# Patient Record
Sex: FEMALE
[De-identification: names, ages, dates, MRNs, and addresses within clinical notes are randomized per-mention and may not be internally consistent; named-entity substitution may affect disease eponyms.]

---

## 2019-02-19 ENCOUNTER — HOSPITAL ENCOUNTER (INPATIENT)
Dept: HOSPITAL 56 - MW.OBCHECK | Age: 34
LOS: 1 days | Discharge: HOME | End: 2019-02-20
Attending: OBSTETRICS & GYNECOLOGY | Admitting: OBSTETRICS & GYNECOLOGY
Payer: MEDICAID

## 2019-02-19 DIAGNOSIS — Z88.0: ICD-10-CM

## 2019-02-19 DIAGNOSIS — F32.9: ICD-10-CM

## 2019-02-19 DIAGNOSIS — E66.9: ICD-10-CM

## 2019-02-19 DIAGNOSIS — Z3A.38: ICD-10-CM

## 2019-02-19 DIAGNOSIS — F41.9: ICD-10-CM

## 2019-02-19 DIAGNOSIS — Z87.891: ICD-10-CM

## 2019-02-19 PROCEDURE — 10907ZC DRAINAGE OF AMNIOTIC FLUID, THERAPEUTIC FROM PRODUCTS OF CONCEPTION, VIA NATURAL OR ARTIFICIAL OPENING: ICD-10-PCS | Performed by: OBSTETRICS & GYNECOLOGY

## 2019-02-19 NOTE — OR
SURGEON:

Lebron Britt MD

 

DATE OF PROCEDURE:  2019

 

Ms. Pierre is a 34-year-old patient, she is para 3-0-0-3.  She is followed in

our clinic primarily by our nurse midwife.  She is admitted in active labor

early this morning.  At the time of admission, she was 4 cm, 80% vertex with

bulging bag of water with regular contraction as the patient had artificial

rupture of the membrane, which was clear fluid.  The patient continued to

contract without any aid of any Pitocin or any stimulation.  She went to 7, 9,

and then complete.  When she was able to accomplish normal spontaneous vaginal

delivery of a female fetus, cried immediately.  There was one nuchal cord and

later on, Apgar scores reported to be 8 and 9.  The weight is not available.

The placenta delivered spontaneous, complete, and intact without any problem.

There was no perineal or labial laceration.  Estimated blood loss was 250 to 300

mL.  Fetal heart rate was category 1 through the entire process of labor.  There

was no complication in this labor and delivery process.

 

 

JOSSY / KENNETH

DD:  2019 17:20:11

DT:  2019 17:33:47

Job #:  [id number]

## 2019-02-20 VITALS — DIASTOLIC BLOOD PRESSURE: 59 MMHG | SYSTOLIC BLOOD PRESSURE: 117 MMHG

## 2019-02-20 NOTE — PCM.LDHP
L&D History of Present Illness





- General


Date of Service: 02/20/19


Admit Problem/Dx: 


 Patient Status Order with Admit Dx/Problem





02/19/19 10:50


Patient Status [ADT] Routine 





02/19/19 17:15


Patient Status [ADT] Routine 








 Admission Diagnosis/Problem











Admission Diagnosis/Problem    Pregnancy














Source of Information: Patient


History Limitations: Reports: No Limitations





- History of Present Illness


Pain Score: 2


Improves with: Reports: None


Worsens with: Reports: None


Associated Symptoms: Reports: N





- Related Data


Allergies/Adverse Reactions: 


 Allergies











Allergy/AdvReac Type Severity Reaction Status Date / Time


 


Penicillins Allergy  Rash Verified 10/23/17 20:38











Home Medications: 


 Home Meds





Prenatal Vit Calc,Iron,Folic [Prenatal Vitamins] 1 tab PO DAILY 10/24/17 [

History]











Past Medical History


HEENT History: Reports: Other (See Below)


Other HEENT History: has upper dental bridge


Cardiovascular History: Reports: None


Respiratory History: Reports: None


Gastrointestinal History: Reports: None


Genitourinary History: Reports: None


OB/GYN History: Reports: Pregnancy


Musculoskeletal History: Reports: None


Neurological History: Reports: None


Psychiatric History: Reports: Anxiety, Depression


Endocrine/Metabolic History: Reports: Obesity/BMI 30+


Hematologic History: Reports: None


Immunologic History: Reports: None


Oncologic (Cancer) History: Reports: None


Dermatologic History: Reports: None





- Infectious Disease History


Infectious Disease History: Reports: Chicken Pox





- Past Surgical History


HEENT Surgical History: Reports: Adenoidectomy, Tonsillectomy





Social & Family History





- Family History


Family Medical History: Noncontributory


Respiratory: Reports: COPD, Other (See Below)


Other Respiratory Family Hisory: emphysema


: Reports: Other (See Below)


Other  Family History: kidney disease


OBGYN: Reports: Pregnancy


Endocrine/Metabolic: Reports: Diabetes, type II


Oncologic: Reports: Cervix, Lung





- Tobacco Use


Smoking Status *Q: Former Smoker


Used Tobacco, but Quit: Yes


Month/Year Tobacco Last Used: 2018





- Caffeine Use


Caffeine Use: Reports: Coffee, Energy Drinks, Soda





- Recreational Drug Use


Recreational Drug Use: No





H&P Review of Systems





- Review of Systems:


Review Of Systems: See Below


General: Reports: No Symptoms


HEENT: Reports: No Symptoms


Pulmonary: Reports: No Symptoms


Cardiovascular: Reports: No Symptoms


Gastrointestinal: Reports: No Symptoms


Genitourinary: Reports: No Symptoms


Musculoskeletal: Reports: No Symptoms


Skin: Reports: No Symptoms


Psychiatric: Reports: No Symptoms


Neurological: Reports: No Symptoms


Hematologic/Lymphatic: Reports: No Symptoms


Immunologic: Reports: No Symptoms





L&D Exam





- Exam


Exam: See Below





- Vital Signs


Vital Signs: 


 Last Vital Signs











Temp  36.7 C   02/19/19 19:30


 


Pulse  90   02/19/19 19:30


 


Resp  16   02/19/19 19:30


 


BP  109/62   02/19/19 19:30


 


Pulse Ox  99   02/19/19 19:30











Weight: 80.286 kg





- OB Specific


Fundal Height In cm: 37


Contraction Intensity: Moderate


Fetal Movement: Active


Fetal Heart Tones: Present


Birth Presentation: Vertex





- Palumbo Score


Palumbo Score Cervix Position: Anterior


Palumbo Score Consistency: Soft


Palumbo Score Effacement: >80%


Palumbo Score Dilation: 3-4 cm


Palumbo Score Infant's Station: -2


Palumbo Score Total: 10





- Exam


General: Alert, Oriented


HEENT: PERRLA, Conjunctiva Clear, EACs Clear, EOMI, Hearing Intact, Mucosa 

Moist & Pink, Nares Patent, Normal Nasal Septum, Posterior Pharynx Clear, TMs 

Clear


Neck: Supple, Trachea Midline


Lungs: Clear to Auscultation, Normal Respiratory Effort


Cardiovascular: Regular Rate, Regular Rhythm


GI/Abdominal Exam: Normal Bowel Sounds, Soft, Non-Tender, No Organomegaly, No 

Distention, No Abnormal Bruit, No Mass, Pelvis Stable


Rectal Exam: Normal Exam, Normal Rectal Tone


Genitourinary: Normal external exam, Normal bimanual exam, Normal speculum exam


Back Exam: Normal Inspection, Full Range of Motion


Extremities: Normal Inspection, Normal Range of Motion, Non-Tender, No Pedal 

Edema, Normal Capillary Refill


Skin: Warm, Dry, Intact


Neurological: Cranial Nerves Intact, Reflexes Equal Bilateral


Psychiatric: Alert, Normal Affect, Normal Mood





- Patient Data


Lab Results Last 24 hrs: 


 Laboratory Results - last 24 hr











  02/19/19 02/19/19 02/20/19 Range/Units





  11:09 11:09 04:50 


 


WBC  16.70 H    (4.0-11.0)  K/uL


 


RBC  3.85 L    (4.30-5.90)  M/uL


 


Hgb  12.5   10.4 L  (12.0-16.0)  g/dL


 


Hct  36.7   31.0 L  (36.0-46.0)  %


 


MCV  95.3    (80.0-98.0)  fL


 


MCH  32.5 H    (27.0-32.0)  pg


 


MCHC  34.1    (31.0-37.0)  g/dL


 


RDW Std Deviation  44.4    (28.0-62.0)  fl


 


RDW Coeff of Yamile  13    (11.0-15.0)  %


 


Plt Count  273    (150-400)  K/uL


 


MPV  10.20    (7.40-12.00)  fL


 


Nucleated RBC %  0.0    /100WBC


 


Nucleated RBCs #  0    K/uL


 


Blood Type   O POSITIVE   


 


Antibody Screen   NEGATIVE   











Result Diagrams: 


 02/20/19 04:50





Problem List Initiated/Reviewed/Updated: Yes


Orders Last 24hrs: 


 Active Orders 24 hr











 Category Date Time Status


 


 Patient Status [ADT] Routine ADT  02/19/19 17:15 Active


 


 Communication Order [RC] PER UNIT ROUTINE Care  02/19/19 17:15 Active


 


 Fetal Non Stress Test [RC] PER UNIT ROUTINE Care  02/19/19 10:34 Inactive


 


 May Shower [RC] ASDIRECTED Care  02/19/19 10:50 Active


 


 May Shower [RC] ASDIRECTED Care  02/19/19 17:15 Active


 


 RT Incentive Spirometry [RC] Q2HWA Care  02/19/19 17:15 Active


 


 Up ad Ana Cristina [RC] ASDIRECTED Care  02/19/19 10:34 Inactive


 


 Up ad Ana Cristina [RC] ASDIRECTED Care  02/19/19 10:50 Active


 


 Vaginal Exam [RC] Click to Edit Care  02/19/19 10:34 Inactive


 


 Vital Signs [RC] PER UNIT ROUTINE Care  02/19/19 10:34 Inactive


 


 Vital Signs [RC] PER UNIT ROUTINE Care  02/19/19 10:50 Active


 


 Vital Signs [RC] PER UNIT ROUTINE Care  02/19/19 17:15 Active


 


 Regular Diet [DIET] Diet  02/19/19 Dinner Active


 


 Acetaminophen/oxyCODONE [Percocet 325-5 MG] Med  02/19/19 17:15 Active





 1 tab PO Q4H PRN   


 


 Acetaminophen/oxyCODONE [Percocet 325-5 MG] Med  02/19/19 17:15 Active





 2 tab PO Q4H PRN   


 


 Bisacodyl [Dulcolax] Med  02/19/19 17:15 Active





 10 mg RECTAL ONETIME PRN   


 


 Butorphanol [Stadol] Med  02/19/19 15:36 Active





 1 mg IVPUSH Q1H PRN   


 


 Carboprost Tromethamine [Hemabate DS] Med  02/19/19 15:36 Active





 250 mcg IM ASDIRECTED PRN   


 


 Docusate Sodium [Colace] Med  02/19/19 21:00 Active





 100 mg PO BID   


 


 Ibuprofen [Motrin] Med  02/19/19 17:15 Active





 800 mg PO Q8H PRN   


 


 Ketorolac [Toradol] Med  02/19/19 17:15 Active





 30 mg IVPUSH Q6H   


 


 Lactated Ringers [Ringers, Lactated] 1,000 ml Med  02/19/19 15:45 Active





 IV ASDIRECTED   


 


 Lactated Ringers [Ringers, Lactated] 1,000 ml Med  02/19/19 17:15 Active





 IV ASDIRECTED   


 


 Lanolin [Lansinoh HPA] Med  02/19/19 17:15 Active





 See Dose Instructions  TOP ASDIRECTED PRN   


 


 Lidocaine 1% [Xylocaine 1%] Med  02/19/19 15:36 Active





 50 ml INJECT ONETIME PRN   


 


 Methylergonovine [Methergine] Med  02/19/19 15:36 Active





 0.2 mg IM ASDIRECTED PRN   


 


 Nalbuphine [Nubain] Med  02/19/19 15:36 Active





 10 mg IVPUSH Q1H PRN   


 


 Ondansetron [Zofran] Med  02/19/19 17:15 Active





 4 mg IVPUSH Q4H PRN   


 


 Oxytocin/0.9 % Sodium Chloride [Oxytocin 30 Unit/500 ML Med  02/19/19 15:45 

Active





 -NS]   





 30 unit in 500 ml IV TITRATE   


 


 Tranexamic Acid [Cyklokapron] 1,000 mg Med  02/19/19 15:36 Active





 Sodium Chloride 0.9% [Normal Saline] 100 ml   





 IV ONETIME   


 


 Water For Irrigation,Sterile [Sterile Water for Med  02/19/19 15:36 Active





 Irrigation]   





 1,000 ml IRR ASDIRECTED PRN   


 


 diphenhydrAMINE [Benadryl] Med  02/19/19 17:15 Active





 25 mg IVPUSH Q6H PRN   


 


 miSOPROStol [Cytotec] Med  02/19/19 15:36 Active





 200 mcg PO ONETIME PRN   


 


 Assess Lochia [WOMSER] Per Unit Routine Oth  02/19/19 17:15 Ordered


 


 Assess Uterine Involution [WOMSER] Per Unit Routine Ot  02/19/19 17:15 Ordered


 


 Breast Pump [WOMSER] Per Unit Routine Ot  02/19/19 17:15 Ordered


 


 Fetal Scalp Electrode [WOMSER] Per Unit Routine Ot  02/19/19 10:50 Ordered


 


 Peripheral IV Discontinue [OM.PC] Routine Ot  02/19/19 17:15 Ordered


 


 Peripheral IV Insertion Adult [OM.PC] Routine Ot  02/19/19 10:50 Ordered


 


 Sequential Compression Device [OM.PC] Per Unit Routine Ot  02/19/19 17:15 

Ordered


 


 Resuscitation Status Routine Resus Stat  02/19/19 10:50 Ordered








 Medication Orders





Bisacodyl (Dulcolax)  10 mg RECTAL ONETIME PRN


   PRN Reason: Constipation


Butorphanol Tartrate (Stadol)  1 mg IVPUSH Q1H PRN


   PRN Reason: Pain


Carboprost Tromethamine (Hemabate Ds)  250 mcg IM ASDIRECTED PRN


   PRN Reason: Post Partum Hemorrhage


Diphenhydramine HCl (Benadryl)  25 mg IVPUSH Q6H PRN


   PRN Reason: Itching or Nausea


Docusate Sodium (Colace)  100 mg PO BID [facility]


Emollient Ointment (Lansinoh Hpa)  0 gm TOP ASDIRECTED PRN


   PRN Reason: Sore Nipples


Lactated Ringer's (Ringers, Lactated)  1,000 mls @ 150 mls/hr IV ASDIRECTED [facility]


   Last Admin: 02/19/19 13:41  Dose: 150 mls/hr


Oxytocin/Sodium Chloride (Oxytocin 30 Unit/500 Ml-Ns)  30 unit in 500 mls @ 999 

mls/hr IV TITRATE [facility]


   Last Admin: 02/19/19 17:07  Dose: 999 mls/hr


Tranexamic Acid 1,000 mg/ (Sodium Chloride)  110 mls @ 660 mls/hr IV ONETIME PRN


   PRN Reason: Bleeding


Lactated Ringer's (Ringers, Lactated)  1,000 mls @ 125 mls/hr IV ASDIRECTED [facility]


Ibuprofen (Motrin)  800 mg PO Q8H PRN


   PRN Reason: mild pain or fever


   Last Admin: 02/19/19 19:10  Dose: 800 mg


Ketorolac Tromethamine (Toradol)  30 mg IVPUSH Q6H [facility]


   Stop: 02/20/19 17:16


Lidocaine HCl (Xylocaine 1%)  50 ml INJECT ONETIME PRN


   PRN Reason: Laceration repair


Methylergonovine Maleate (Methergine)  0.2 mg IM ASDIRECTED PRN


   PRN Reason: Post Partum Hemorrhage


Misoprostol (Cytotec)  200 mcg PO ONETIME PRN


   PRN Reason: Post Partum Hemorrhage


Nalbuphine HCl (Nubain)  10 mg IVPUSH Q1H PRN


   PRN Reason: Pain (severe 7-10)


Ondansetron HCl (Zofran)  4 mg IVPUSH Q4H PRN


   PRN Reason: Nausea/Vomiting


Oxycodone/Acetaminophen (Percocet 325-5 Mg)  1 tab PO Q4H PRN


   PRN Reason: Pain (moderate 4-6)


Oxycodone/Acetaminophen (Percocet 325-5 Mg)  2 tab PO Q4H PRN


   PRN Reason: Pain (moderate 4-6)


Sterile Water (Sterile Water For Irrigation)  1,000 ml IRR ASDIRECTED PRN


   PRN Reason: delivery








Assessment/Plan Comment:: 





Term multiple patient admitted in active labor anticipating normal spontaneous 

vaginal delivery

## 2019-02-20 NOTE — PCM.PNPP
- General Info


Date of Service: 19


Functional Status: Reports: Pain Controlled





- Review of Systems


General: Reports: No Symptoms


HEENT: Reports: No Symptoms


Pulmonary: Reports: No Symptoms


Cardiovascular: Reports: No Symptoms


Gastrointestinal: Reports: No Symptoms


Genitourinary: Reports: No Symptoms


Musculoskeletal: Reports: No Symptoms


Skin: Reports: No Symptoms


Neurological: Reports: No Symptoms


Psychiatric: Reports: No Symptoms





- General Info


Date of Service: 19





- Patient Data


Vital Signs - Most Recent: 


 Last Vital Signs











Temp  36.7 C   19 19:30


 


Pulse  90   19 19:30


 


Resp  16   19 19:30


 


BP  109/62   19 19:30


 


Pulse Ox  99   19 19:30











Weight - Most Recent: 80.286 kg


Lab Results - Last 24 Hours: 


 Laboratory Results - last 24 hr











  19 Range/Units





  11:09 11:09 04:50 


 


WBC  16.70 H    (4.0-11.0)  K/uL


 


RBC  3.85 L    (4.30-5.90)  M/uL


 


Hgb  12.5   10.4 L  (12.0-16.0)  g/dL


 


Hct  36.7   31.0 L  (36.0-46.0)  %


 


MCV  95.3    (80.0-98.0)  fL


 


MCH  32.5 H    (27.0-32.0)  pg


 


MCHC  34.1    (31.0-37.0)  g/dL


 


RDW Std Deviation  44.4    (28.0-62.0)  fl


 


RDW Coeff of Yamile  13    (11.0-15.0)  %


 


Plt Count  273    (150-400)  K/uL


 


MPV  10.20    (7.40-12.00)  fL


 


Nucleated RBC %  0.0    /100WBC


 


Nucleated RBCs #  0    K/uL


 


Blood Type   O POSITIVE   


 


Antibody Screen   NEGATIVE   











Med Orders - Current: 


 Current Medications





Bisacodyl (Dulcolax)  10 mg RECTAL ONETIME PRN


   PRN Reason: Constipation


Butorphanol Tartrate (Stadol)  1 mg IVPUSH Q1H PRN


   PRN Reason: Pain


Carboprost Tromethamine (Hemabate Ds)  250 mcg IM ASDIRECTED PRN


   PRN Reason: Post Partum Hemorrhage


Diphenhydramine HCl (Benadryl)  25 mg IVPUSH Q6H PRN


   PRN Reason: Itching or Nausea


Docusate Sodium (Colace)  100 mg PO BID [facility]


Emollient Ointment (Lansinoh Hpa)  0 gm TOP ASDIRECTED PRN


   PRN Reason: Sore Nipples


Lactated Ringer's (Ringers, Lactated)  1,000 mls @ 150 mls/hr IV ASDIRECTED [facility]


   Last Admin: 19 13:41 Dose:  150 mls/hr


Oxytocin/Sodium Chloride (Oxytocin 30 Unit/500 Ml-Ns)  30 unit in 500 mls @ 999 

mls/hr IV TITRATE [facility]


   Last Admin: 19 17:07 Dose:  999 mls/hr


Tranexamic Acid 1,000 mg/ (Sodium Chloride)  110 mls @ 660 mls/hr IV ONETIME PRN


   PRN Reason: Bleeding


Lactated Ringer's (Ringers, Lactated)  1,000 mls @ 125 mls/hr IV ASDIRECTED [facility]


Ibuprofen (Motrin)  800 mg PO Q8H PRN


   PRN Reason: mild pain or fever


   Last Admin: 19 19:10 Dose:  800 mg


Ketorolac Tromethamine (Toradol)  30 mg IVPUSH Q6H [facility]


   Stop: 19 17:16


Lidocaine HCl (Xylocaine 1%)  50 ml INJECT ONETIME PRN


   PRN Reason: Laceration repair


Methylergonovine Maleate (Methergine)  0.2 mg IM ASDIRECTED PRN


   PRN Reason: Post Partum Hemorrhage


Misoprostol (Cytotec)  200 mcg PO ONETIME PRN


   PRN Reason: Post Partum Hemorrhage


Nalbuphine HCl (Nubain)  10 mg IVPUSH Q1H PRN


   PRN Reason: Pain (severe 7-10)


Ondansetron HCl (Zofran)  4 mg IVPUSH Q4H PRN


   PRN Reason: Nausea/Vomiting


Oxycodone/Acetaminophen (Percocet 325-5 Mg)  1 tab PO Q4H PRN


   PRN Reason: Pain (moderate 4-6)


Oxycodone/Acetaminophen (Percocet 325-5 Mg)  2 tab PO Q4H PRN


   PRN Reason: Pain (moderate 4-6)


Sterile Water (Sterile Water For Irrigation)  1,000 ml IRR ASDIRECTED PRN


   PRN Reason: delivery





Discontinued Medications





Acetaminophen (Tylenol Extra Strength) Confirm Administered Dose 1,000 mg 

.ROUTE .STK-MED ONE


   Stop: 19 00:27











- Infant Interaction


Infant Disposition, Postpartum: [place] in Room with Family


Infant Interaction: Holding Infant


Infant Feeding: Attempted Breastfeeding; Nursed Fair/Poor


Support Person: Significant Other





- Exam


General: Alert, Oriented


HEENT: Pupils Equal


Neck: Supple


Lungs: Clear to Auscultation, Normal Respiratory Effort


Cardiovascular: Regular Rate, Regular Rhythm


GI/Abdominal Exam: Normal Bowel Sounds, Soft, Non-Tender, No Organomegaly, No 

Distention, No Abnormal Bruit, No Mass, Pelvis Stable


Extremities: Normal Inspection, Normal Range of Motion, Non-Tender, No Pedal 

Edema, Normal Capillary Refill


Skin: Warm, Dry, Intact


Wound/Incisions: Healing Well


Neurological: No New Focal Deficit


Psy/Mental Status: Alert, Normal Affect, Normal Mood





- Problem List Review


Problem List Initiated/Reviewed/Updated: Yes





- My Orders


Last 24 Hours: 


My Active Orders





19 10:34


Fetal Non Stress Test [RC] PER UNIT ROUTINE 


Up ad Ana Cristina [RC] ASDIRECTED 


Vaginal Exam [RC] Click to Edit 


Vital Signs [RC] PER UNIT ROUTINE 





19 10:50


May Shower [RC] ASDIRECTED 


Up ad Ana Cristina [RC] ASDIRECTED 


Vital Signs [RC] PER UNIT ROUTINE 


Fetal Scalp Electrode [WOMSER] Per Unit Routine 


Peripheral IV Insertion Adult [OM.PC] Routine 


Resuscitation Status Routine 





19 15:36


Butorphanol [Stadol]   1 mg IVPUSH Q1H PRN 


Carboprost Tromethamine [Hemabate DS]   250 mcg IM ASDIRECTED PRN 


Lidocaine 1% [Xylocaine 1%]   50 ml INJECT ONETIME PRN 


Methylergonovine [Methergine]   0.2 mg IM ASDIRECTED PRN 


Nalbuphine [Nubain]   10 mg IVPUSH Q1H PRN 


Tranexamic Acid [Cyklokapron] 1,000 mg   Sodium Chloride 0.9% [Normal Saline] 

100 ml IV ONETIME 


Water For Irrigation,Sterile [Sterile Water for Irrigation]   1,000 ml IRR 

ASDIRECTED PRN 


miSOPROStol [Cytotec]   200 mcg PO ONETIME PRN 





19 15:45


Lactated Ringers [Ringers, Lactated] 1,000 ml IV ASDIRECTED 


Oxytocin/0.9 % Sodium Chloride [Oxytocin 30 Unit/500 ML-NS] 30 unit in 500 ml 

IV TITRATE 





19 17:15


Patient Status [ADT] Routine 


Communication Order [RC] PER UNIT ROUTINE 


May Shower [RC] ASDIRECTED 


RT Incentive Spirometry [RC] Q2HWA 


Vital Signs [RC] PER UNIT ROUTINE 


Acetaminophen/oxyCODONE [Percocet 325-5 MG]   1 tab PO Q4H PRN 


Acetaminophen/oxyCODONE [Percocet 325-5 MG]   2 tab PO Q4H PRN 


Bisacodyl [Dulcolax]   10 mg RECTAL ONETIME PRN 


Ibuprofen [Motrin]   800 mg PO Q8H PRN 


Ketorolac [Toradol]   30 mg IVPUSH Q6H 


Lactated Ringers [Ringers, Lactated] 1,000 ml IV ASDIRECTED 


Lanolin [Lansinoh HPA]   See Dose Instructions  TOP ASDIRECTED PRN 


Ondansetron [Zofran]   4 mg IVPUSH Q4H PRN 


diphenhydrAMINE [Benadryl]   25 mg IVPUSH Q6H PRN 


Assess Lochia [WOMSER] Per Unit Routine 


Assess Uterine Involution [WOMSER] Per Unit Routine 


Breast Pump [WOMSER] Per Unit Routine 


Peripheral IV Discontinue [OM.PC] Routine 


Sequential Compression Device [OM.PC] Per Unit Routine 





19 21:00


Docusate Sodium [Colace]   100 mg PO BID 





19 Dinner


Regular Diet [DIET] 














- Plan


Plan:: 





Term multiple patient admitted in active labor anticipating normal spontaneous 

vaginal delivery

## 2019-02-20 NOTE — PCM.DCSUM1
**Discharge Summary





- Hospital Course


Diagnosis: Stroke: No





- Discharge Data


Discharge Date: 02/20/19


Discharge Disposition: Home, Self-Care 01


Condition: Good





- Patient Instructions


Diet: Usual Diet as Tolerated


Activity: As Tolerated


Driving: Do Not Drive


Showering/Bathing: May Shower





- Discharge Plan


Home Medications: 


 Home Meds





Prenatal Vit Calc,Iron,Folic [Prenatal Vitamins] 1 tab PO DAILY 10/24/17 [

History]








Referrals: 


[facility] [Outside]


Aspen Garrido CNM [Primary Care Provider] - 04/03/19 8:00 am





- Discharge Summary/Plan Comment


DC Time >30 min.: Yes





- General Info


Date of Service: 02/20/19


Functional Status: Reports: Pain Controlled





- Review of Systems


General: Reports: No Symptoms


HEENT: Reports: No Symptoms


Pulmonary: Reports: No Symptoms


Cardiovascular: Reports: No Symptoms


Gastrointestinal: Reports: No Symptoms


Genitourinary: Reports: No Symptoms


Musculoskeletal: Reports: No Symptoms


Skin: Reports: No Symptoms


Neurological: Reports: No Symptoms


Psychiatric: Reports: No Symptoms





- Patient Data


Vitals - Most Recent: 


 Last Vital Signs











Temp  36.7 C   02/19/19 19:30


 


Pulse  90   02/19/19 19:30


 


Resp  16   02/19/19 19:30


 


BP  109/62   02/19/19 19:30


 


Pulse Ox  99   02/19/19 19:30











Weight - Most Recent: 80.286 kg


Lab Results - Last 24 hrs: 


 Laboratory Results - last 24 hr











  02/19/19 02/19/19 02/20/19 Range/Units





  11:09 11:09 04:50 


 


WBC  16.70 H    (4.0-11.0)  K/uL


 


RBC  3.85 L    (4.30-5.90)  M/uL


 


Hgb  12.5   10.4 L  (12.0-16.0)  g/dL


 


Hct  36.7   31.0 L  (36.0-46.0)  %


 


MCV  95.3    (80.0-98.0)  fL


 


MCH  32.5 H    (27.0-32.0)  pg


 


MCHC  34.1    (31.0-37.0)  g/dL


 


RDW Std Deviation  44.4    (28.0-62.0)  fl


 


RDW Coeff of Yamile  13    (11.0-15.0)  %


 


Plt Count  273    (150-400)  K/uL


 


MPV  10.20    (7.40-12.00)  fL


 


Nucleated RBC %  0.0    /100WBC


 


Nucleated RBCs #  0    K/uL


 


Blood Type   O POSITIVE   


 


Antibody Screen   NEGATIVE   











Med Orders - Current: 


 Current Medications





Bisacodyl (Dulcolax)  10 mg RECTAL ONETIME PRN


   PRN Reason: Constipation


Butorphanol Tartrate (Stadol)  1 mg IVPUSH Q1H PRN


   PRN Reason: Pain


Carboprost Tromethamine (Hemabate Ds)  250 mcg IM ASDIRECTED PRN


   PRN Reason: Post Partum Hemorrhage


Diphenhydramine HCl (Benadryl)  25 mg IVPUSH Q6H PRN


   PRN Reason: Itching or Nausea


Docusate Sodium (Colace)  100 mg PO BID [facility]


Emollient Ointment (Lansinoh Hpa)  0 gm TOP ASDIRECTED PRN


   PRN Reason: Sore Nipples


Lactated Ringer's (Ringers, Lactated)  1,000 mls @ 150 mls/hr IV ASDIRECTED [facility]


   Last Admin: 02/19/19 13:41 Dose:  150 mls/hr


Oxytocin/Sodium Chloride (Oxytocin 30 Unit/500 Ml-Ns)  30 unit in 500 mls @ 999 

mls/hr IV TITRATE [facility]


   Last Admin: 02/19/19 17:07 Dose:  999 mls/hr


Tranexamic Acid 1,000 mg/ (Sodium Chloride)  110 mls @ 660 mls/hr IV ONETIME PRN


   PRN Reason: Bleeding


Lactated Ringer's (Ringers, Lactated)  1,000 mls @ 125 mls/hr IV ASDIRECTED [facility]


Ibuprofen (Motrin)  800 mg PO Q8H PRN


   PRN Reason: mild pain or fever


   Last Admin: 02/19/19 19:10 Dose:  800 mg


Ketorolac Tromethamine (Toradol)  30 mg IVPUSH Q6H [facility]


   Stop: 02/20/19 17:16


Lidocaine HCl (Xylocaine 1%)  50 ml INJECT ONETIME PRN


   PRN Reason: Laceration repair


Methylergonovine Maleate (Methergine)  0.2 mg IM ASDIRECTED PRN


   PRN Reason: Post Partum Hemorrhage


Misoprostol (Cytotec)  200 mcg PO ONETIME PRN


   PRN Reason: Post Partum Hemorrhage


Nalbuphine HCl (Nubain)  10 mg IVPUSH Q1H PRN


   PRN Reason: Pain (severe 7-10)


Ondansetron HCl (Zofran)  4 mg IVPUSH Q4H PRN


   PRN Reason: Nausea/Vomiting


Oxycodone/Acetaminophen (Percocet 325-5 Mg)  1 tab PO Q4H PRN


   PRN Reason: Pain (moderate 4-6)


Oxycodone/Acetaminophen (Percocet 325-5 Mg)  2 tab PO Q4H PRN


   PRN Reason: Pain (moderate 4-6)


Sterile Water (Sterile Water For Irrigation)  1,000 ml IRR ASDIRECTED PRN


   PRN Reason: delivery





Discontinued Medications





Acetaminophen (Tylenol Extra Strength) Confirm Administered Dose 1,000 mg 

.ROUTE .[facility]


   Stop: 02/20/19 00:27











- Exam


General: Reports: Alert, Oriented


HEENT: Reports: Pupils Equal, Pupils Reactive, EOMI, Mucous Membr. Moist/Pink


Neck: Reports: Supple


Lungs: Reports: Clear to Auscultation, Normal Respiratory Effort


Cardiovascular: Reports: Regular Rate, Regular Rhythm


GI/Abdominal Exam: Normal Bowel Sounds, Soft, Non-Tender, No Organomegaly, No 

Distention, No Abnormal Bruit, No Mass, Pelvis Stable


 (Female) Exam: Normal External Exam, Normal Speculum Exam, Normal Bimanual 

Exam


Rectal (Female) Exam: Normal Exam, Normal Rectal Tone


Back Exam: Reports: Normal Inspection, Full Range of Motion


Extremities: Normal Inspection, Normal Range of Motion, Non-Tender, No Pedal 

Edema, Normal Capillary Refill


Skin: Reports: Warm, Dry, Intact


Wound/Incisions: Reports: Healing Well


Neurological: Reports: No New Focal Deficit


Psy/Mental Status: Reports: Alert, Normal Affect, Normal Mood

## 2020-03-26 ENCOUNTER — HOSPITAL ENCOUNTER (EMERGENCY)
Dept: HOSPITAL 56 - MW.ED | Age: 35
Discharge: HOME | End: 2020-03-26
Payer: SELF-PAY

## 2020-03-26 VITALS — SYSTOLIC BLOOD PRESSURE: 98 MMHG | DIASTOLIC BLOOD PRESSURE: 67 MMHG | HEART RATE: 99 BPM

## 2020-03-26 DIAGNOSIS — Z88.0: ICD-10-CM

## 2020-03-26 DIAGNOSIS — O03.4: Primary | ICD-10-CM

## 2020-03-26 LAB
BUN SERPL-MCNC: 8 MG/DL (ref 7–18)
CHLORIDE SERPL-SCNC: 106 MMOL/L (ref 98–107)
CO2 SERPL-SCNC: 26.5 MMOL/L (ref 21–32)
GLUCOSE SERPL-MCNC: 108 MG/DL (ref 74–106)
POTASSIUM SERPL-SCNC: 4.2 MMOL/L (ref 3.5–5.1)
SODIUM SERPL-SCNC: 141 MMOL/L (ref 136–145)

## 2020-03-26 PROCEDURE — 36415 COLL VENOUS BLD VENIPUNCTURE: CPT

## 2020-03-26 PROCEDURE — 96375 TX/PRO/DX INJ NEW DRUG ADDON: CPT

## 2020-03-26 PROCEDURE — 80053 COMPREHEN METABOLIC PANEL: CPT

## 2020-03-26 PROCEDURE — 86920 COMPATIBILITY TEST SPIN: CPT

## 2020-03-26 PROCEDURE — 96374 THER/PROPH/DIAG INJ IV PUSH: CPT

## 2020-03-26 PROCEDURE — 84702 CHORIONIC GONADOTROPIN TEST: CPT

## 2020-03-26 PROCEDURE — 85025 COMPLETE CBC W/AUTO DIFF WBC: CPT

## 2020-03-26 PROCEDURE — 86900 BLOOD TYPING SEROLOGIC ABO: CPT

## 2020-03-26 PROCEDURE — 86922 COMPATIBILITY TEST ANTIGLOB: CPT

## 2020-03-26 PROCEDURE — 85384 FIBRINOGEN ACTIVITY: CPT

## 2020-03-26 PROCEDURE — 71045 X-RAY EXAM CHEST 1 VIEW: CPT

## 2020-03-26 PROCEDURE — P9016 RBC LEUKOCYTES REDUCED: HCPCS

## 2020-03-26 PROCEDURE — 96372 THER/PROPH/DIAG INJ SC/IM: CPT

## 2020-03-26 PROCEDURE — 99285 EMERGENCY DEPT VISIT HI MDM: CPT

## 2020-03-26 PROCEDURE — 86921 COMPATIBILITY TEST INCUBATE: CPT

## 2020-03-26 PROCEDURE — 86850 RBC ANTIBODY SCREEN: CPT

## 2020-03-26 PROCEDURE — 36430 TRANSFUSION BLD/BLD COMPNT: CPT

## 2020-03-26 PROCEDURE — 85610 PROTHROMBIN TIME: CPT

## 2020-03-26 PROCEDURE — 86901 BLOOD TYPING SEROLOGIC RH(D): CPT

## 2020-03-26 PROCEDURE — 85730 THROMBOPLASTIN TIME PARTIAL: CPT

## 2020-03-26 NOTE — EDM.PDOC
ED [facility] GENERAL MEDICAL PROBLEM





- General


Chief Complaint: OB/GYN Problem


Stated Complaint: MISCARRIAGE


Time Seen by Provider: 03/26/20 09:00


Source of Information: Reports: Patient, EMS


History Limitations: Reports: No Limitations





- History of Present Illness


INITIAL COMMENTS - FREE TEXT/NARRATIVE: 


Patient is a 28-year-old 5-year-old female with no significant past medical 

history presenting with a chief complaint of vaginal bleeding.  Patient was 

approximately 10 to 11 weeks pregnant and had a miscarriage diagnosed several 

days ago.  Patient says she started bleeding this morning and has been passing 

large amounts of blood.  Patient states she is 4 pads and then was sitting in 

the bathtub with increased bleeding.  EMS said that the toilet was full of 

blood as well as the bathtub.  Patient feels associated lightheadedness and 

fatigue.  Patient denies any nausea vomiting.  Patient reports some associated 

abdominal cramping.  Nothing makes symptoms better or worse.  EMS provided the 

patient with 1 g of TXA in the field.  Patient's blood pressure per EMS was 90/

60.





Pmhx: No prior miscarriages


Pshx: None


Family Hx: noncontributory 





Smoking history? no 


Etoh use? none


Drug use? none





In addition to that documented in the HPI above, the additional ROS was obtained

:


Constitutional: Denies fevers or chills


Eyes: Denies vision changes


ENMT: Denies sore throat


CV: Denies chest pain


Resp: Denies SOB


GI: Denies vomiting or diarrhea


: Denies painful urination


MSK: Denies recent trauma


Skin: Denies new rashes


Neuro: Denies new numbness or tingling or weakness


Endocrine: Denies unexpected weight loss


Heme: Denies bleeding disorders





I have reviewed the triage vital signs


Const: Patient is pale in appearance but and not in distress


Eyes: PERRL, no conjunctival injection


HENT: NCAT, Neck supple without meningismus 


CV: RRR, Warm, well-perfused extremities


RESP: CTAB, Unlabored respiratory effort


GI: soft, non-tender, non-distended, no masses


Pelvic (KUNAL Biswas present): Patient demonstrates active vaginal bleeding no 

masses or clots noted.


MSK: No gross deformities appreciated


Skin: Warm, dry. No rashes


Neuro: Alert, CNs II-XII grossly intact. Sensation and motor function of 

extremities grossly intact.


Psych: Appropriate mood and affect





Assessment and plan:


Patient is a 35-year-old female presenting with vaginal bleeding to the ER.  

Patient was initially hypotensive likely secondary to the large hemorrhage.  

Patient was given 2 L of IV fluids initially without much response.  Patient 

was given Methergine and was previously given TXA by EMS.  Patient continued 

her main hypertensive and had further bleeding therefore Dr. Yarbrough was paged 

and he arrived in the emergency department.  He found that patient had retained 

products of conception and remove them and the emergency department.  Patient 

was given 1 unit PRBCs secondary to profound hypotension in the setting of 

acute hemorrhage.  Patient's repeat CBC showed improvement and the patient 

feels much better.  Patient complaining of some slight bleeding but appears 

well and is not passing clots.  Patient's blood pressure was normalized and 

patient has no other complaints.  The patient's blood test did not demonstrate 

any other acute abnormalities of be concerning for DIC.  All questions 

addressed and answered.  Patient given follow-up as an outpatient.





  ** Pelvic


Pain Score (Numeric/FACES): 7





- Related Data


 Allergies











Allergy/AdvReac Type Severity Reaction Status Date / Time


 


Penicillins Allergy  Rash Verified 03/26/20 09:00











Home Meds: 


 Home Meds





Misoprostol [Cytotec] 200 mg PO Q12H 03/26/20 [History]











Past Medical History


HEENT History: Reports: Other (See Below)


Other HEENT History: has upper dental bridge


Cardiovascular History: Reports: None


Respiratory History: Reports: None


Gastrointestinal History: Reports: None


Genitourinary History: Reports: None


OB/GYN History: Reports: Pregnancy


Musculoskeletal History: Reports: None


Neurological History: Reports: None


Psychiatric History: Reports: Anxiety, Depression


Endocrine/Metabolic History: Reports: Obesity/BMI 30+


Hematologic History: Reports: None


Immunologic History: Reports: None


Oncologic (Cancer) History: Reports: None


Dermatologic History: Reports: None





- Infectious Disease History


Infectious Disease History: Reports: Chicken Pox





- Past Surgical History


Head Surgeries/Procedures: Reports: None


HEENT Surgical History: Reports: Adenoidectomy, Tonsillectomy





Social & Family History





- Family History


Family Medical History: Noncontributory


Respiratory: Reports: COPD, Other (See Below)


Other Respiratory Family Hisory: emphysema


: Reports: Other (See Below)


Other  Family History: kidney disease


OBGYN: Reports: Pregnancy


Endocrine/Metabolic: Reports: Diabetes, type II


Oncologic: Reports: Cervix, Lung





- Tobacco Use


Smoking Status *Q: Unknown Ever Smoked





- Caffeine Use


Caffeine Use: Reports: Coffee, Energy Drinks, Soda





- Recreational Drug Use


Recreational Drug Use: No





ED ROS GENERAL





- Review of Systems


Review Of Systems: See Below





ED EXAM PREGNANCY





- Physical Exam


Exam: See Below





Course





- Vital Signs


Last Recorded V/S: 


 Last Vital Signs











Temp  36.7 C   03/26/20 09:01


 


Pulse  85   03/26/20 10:55


 


Resp  17   03/26/20 10:55


 


BP  113/61   03/26/20 10:55


 


Pulse Ox  99   03/26/20 10:55














- Orders/Labs/Meds


Orders: 


 Active Orders 24 hr











 Category Date Time Status


 


 RED BLOOD CELLS LP [BBK] Stat Lab  03/26/20 09:34 Results


 


 TYPE AND SCREEN [BBK] Stat Lab  03/26/20 09:34 Results


 


 Norepinephrine Bit/0.9 % NaCl [Norepinephr-0.9% NaCl 4 Med  03/26/20 11:00 

Active





 mg/250]   





 4 mg in 250 ml IV TITRATE   


 


 Transfuse RBC [Transfuse Red Blood Cells] [COMM] Stat Oth  03/26/20 09:56 

Ordered


 


 Transfuse Red Blood Cells [COMM] Stat Oth  03/26/20 09:50 Ordered








 Medication Orders





Norepinephrine Bitartrate (Norepinephr-0.9% Nacl 4 Mg/250)  4 mg in 250 mls @ 

7.5 mls/hr IV TITRATE PRATEEK; Protocol








Labs: 


 Laboratory Tests











  03/26/20 03/26/20 03/26/20 Range/Units





  09:34 09:34 09:34 


 


WBC  13.66 H    (4.0-11.0)  K/uL


 


RBC  2.90 L    (4.30-5.90)  M/uL


 


Hgb  9.4 L    (12.0-16.0)  g/dL


 


Hct  27.4 L    (36.0-46.0)  %


 


MCV  94.5    (80.0-98.0)  fL


 


MCH  32.4 H    (27.0-32.0)  pg


 


MCHC  34.3    (31.0-37.0)  g/dL


 


RDW Std Deviation  42.1    (28.0-62.0)  fl


 


RDW Coeff of Yamile  12    (11.0-15.0)  %


 


Plt Count  221    (150-400)  K/uL


 


MPV  10.00    (7.40-12.00)  fL


 


Neut % (Auto)  77.2    (48.0-80.0)  %


 


Lymph % (Auto)  16.3    (16.0-40.0)  %


 


Mono % (Auto)  5.8    (0.0-15.0)  %


 


Eos % (Auto)  0.6    (0.0-7.0)  %


 


Baso % (Auto)  0.1    (0.0-1.5)  %


 


Neut # (Auto)  10.5 H    (1.4-5.7)  K/uL


 


Lymph # (Auto)  2.2    (0.6-2.4)  K/uL


 


Mono # (Auto)  0.8    (0.0-0.8)  K/uL


 


Eos # (Auto)  0.1    (0.0-0.7)  K/uL


 


Baso # (Auto)  0.0    (0.0-0.1)  K/uL


 


Nucleated RBC %  0.0    /100WBC


 


Nucleated RBCs #  0    K/uL


 


INR   1.01   


 


APTT     (18.6-31.3)  SEC


 


Fibrinogen   320   (215-411)  mg/dL


 


Sodium    141  (136-145)  mmol/L


 


Potassium    4.2  (3.5-5.1)  mmol/L


 


Chloride    106  ()  mmol/L


 


Carbon Dioxide    26.5  (21.0-32.0)  mmol/L


 


BUN    8  (7.0-18.0)  mg/dL


 


Creatinine    0.8  (0.6-1.0)  mg/dL


 


Est Cr Clr Drug Dosing    86.64  mL/min


 


Estimated GFR (MDRD)    > 60.0  ml/min


 


Glucose    108 H  ()  mg/dL


 


Calcium    8.0 L  (8.5-10.1)  mg/dL


 


Total Bilirubin    0.2  (0.2-1.0)  mg/dL


 


AST    14 L  (15-37)  IU/L


 


ALT    15  (14-63)  IU/L


 


Alkaline Phosphatase    46  ()  U/L


 


Total Protein    5.3 L  (6.4-8.2)  g/dL


 


Albumin    2.6 L  (3.4-5.0)  g/dL


 


Globulin    2.7  (2.6-4.0)  g/dL


 


Albumin/Globulin Ratio    1.0  (0.9-1.6)  


 


HCG, Quant     mIU/mL


 


Blood Type     


 


Antibody Screen     


 


Crossmatch     














  03/26/20 03/26/20 03/26/20 Range/Units





  09:34 09:34 09:34 


 


WBC     (4.0-11.0)  K/uL


 


RBC     (4.30-5.90)  M/uL


 


Hgb     (12.0-16.0)  g/dL


 


Hct     (36.0-46.0)  %


 


MCV     (80.0-98.0)  fL


 


MCH     (27.0-32.0)  pg


 


MCHC     (31.0-37.0)  g/dL


 


RDW Std Deviation     (28.0-62.0)  fl


 


RDW Coeff of Yamile     (11.0-15.0)  %


 


Plt Count     (150-400)  K/uL


 


MPV     (7.40-12.00)  fL


 


Neut % (Auto)     (48.0-80.0)  %


 


Lymph % (Auto)     (16.0-40.0)  %


 


Mono % (Auto)     (0.0-15.0)  %


 


Eos % (Auto)     (0.0-7.0)  %


 


Baso % (Auto)     (0.0-1.5)  %


 


Neut # (Auto)     (1.4-5.7)  K/uL


 


Lymph # (Auto)     (0.6-2.4)  K/uL


 


Mono # (Auto)     (0.0-0.8)  K/uL


 


Eos # (Auto)     (0.0-0.7)  K/uL


 


Baso # (Auto)     (0.0-0.1)  K/uL


 


Nucleated RBC %     /100WBC


 


Nucleated RBCs #     K/uL


 


INR     


 


APTT    19.6  (18.6-31.3)  SEC


 


Fibrinogen     (215-411)  mg/dL


 


Sodium     (136-145)  mmol/L


 


Potassium     (3.5-5.1)  mmol/L


 


Chloride     ()  mmol/L


 


Carbon Dioxide     (21.0-32.0)  mmol/L


 


BUN     (7.0-18.0)  mg/dL


 


Creatinine     (0.6-1.0)  mg/dL


 


Est Cr Clr Drug Dosing     mL/min


 


Estimated GFR (MDRD)     ml/min


 


Glucose     ()  mg/dL


 


Calcium     (8.5-10.1)  mg/dL


 


Total Bilirubin     (0.2-1.0)  mg/dL


 


AST     (15-37)  IU/L


 


ALT     (14-63)  IU/L


 


Alkaline Phosphatase     ()  U/L


 


Total Protein     (6.4-8.2)  g/dL


 


Albumin     (3.4-5.0)  g/dL


 


Globulin     (2.6-4.0)  g/dL


 


Albumin/Globulin Ratio     (0.9-1.6)  


 


HCG, Quant   4110.0   mIU/mL


 


Blood Type  O POSITIVE    


 


Antibody Screen  NEGATIVE    


 


Crossmatch  See Detail    














  03/26/20 Range/Units





  11:45 


 


WBC  13.63 H  (4.0-11.0)  K/uL


 


RBC  3.35 L  (4.30-5.90)  M/uL


 


Hgb  10.7 L  (12.0-16.0)  g/dL


 


Hct  31.7 L  (36.0-46.0)  %


 


MCV  94.6  (80.0-98.0)  fL


 


MCH  31.9  (27.0-32.0)  pg


 


MCHC  33.8  (31.0-37.0)  g/dL


 


RDW Std Deviation  43.3  (28.0-62.0)  fl


 


RDW Coeff of Yamile  13  (11.0-15.0)  %


 


Plt Count  189  (150-400)  K/uL


 


MPV  10.00  (7.40-12.00)  fL


 


Neut % (Auto)  82.7 H  (48.0-80.0)  %


 


Lymph % (Auto)  13.6 L  (16.0-40.0)  %


 


Mono % (Auto)  3.4  (0.0-15.0)  %


 


Eos % (Auto)  0.2  (0.0-7.0)  %


 


Baso % (Auto)  0.1  (0.0-1.5)  %


 


Neut # (Auto)  11.3 H  (1.4-5.7)  K/uL


 


Lymph # (Auto)  1.9  (0.6-2.4)  K/uL


 


Mono # (Auto)  0.5  (0.0-0.8)  K/uL


 


Eos # (Auto)  0.0  (0.0-0.7)  K/uL


 


Baso # (Auto)  0.0  (0.0-0.1)  K/uL


 


Nucleated RBC %  0.0  /100WBC


 


Nucleated RBCs #  0  K/uL


 


INR   


 


APTT   (18.6-31.3)  SEC


 


Fibrinogen   (215-411)  mg/dL


 


Sodium   (136-145)  mmol/L


 


Potassium   (3.5-5.1)  mmol/L


 


Chloride   ()  mmol/L


 


Carbon Dioxide   (21.0-32.0)  mmol/L


 


BUN   (7.0-18.0)  mg/dL


 


Creatinine   (0.6-1.0)  mg/dL


 


Est Cr Clr Drug Dosing   mL/min


 


Estimated GFR (MDRD)   ml/min


 


Glucose   ()  mg/dL


 


Calcium   (8.5-10.1)  mg/dL


 


Total Bilirubin   (0.2-1.0)  mg/dL


 


AST   (15-37)  IU/L


 


ALT   (14-63)  IU/L


 


Alkaline Phosphatase   ()  U/L


 


Total Protein   (6.4-8.2)  g/dL


 


Albumin   (3.4-5.0)  g/dL


 


Globulin   (2.6-4.0)  g/dL


 


Albumin/Globulin Ratio   (0.9-1.6)  


 


HCG, Quant   mIU/mL


 


Blood Type   


 


Antibody Screen   


 


Crossmatch   











Meds: 


Medications











Generic Name Dose Route Start Last Admin





  Trade Name Freq  PRN Reason Stop Dose Admin


 


Norepinephrine Bitartrate  4 mg in 250 mls @ 7.5 mls/hr  03/26/20 11:00  





  Norepinephr-0.9% Nacl 4 Mg/250  IV   





  TITRATE PRATEEK   





     





     





  Protocol   





  2 MCG/MIN   














Discontinued Medications














Generic Name Dose Route Start Last Admin





  Trade Name Freq  PRN Reason Stop Dose Admin


 


Norepinephrine Bitartrate  Confirm  03/26/20 10:55  





  Norepinephr-0.9% Nacl 4 Mg/250  Administered  03/26/20 10:56  





  Dose   





  4 mg in 250 mls @ as directed   





  IV   





  .STK-MED ONE   





     





     





     





     


 


Methylergonovine Maleate  0.2 mg  03/26/20 09:45  03/26/20 09:58





  Methergine  IM  03/26/20 09:46  0.2 mg





  Q4H STA   Administration





     





     





     





     


 


Morphine Sulfate  4 mg  03/26/20 09:01  03/26/20 09:33





  Morphine  IVPUSH  03/26/20 09:02  4 mg





  ONETIME ONE   Administration





     





     





     





     


 


Ondansetron HCl  4 mg  03/26/20 09:02  03/26/20 09:33





  Zofran  IVPUSH  03/26/20 09:03  4 mg





  ONETIME ONE   Administration





     





     





     





     


 


Oxytocin  10 unit  03/26/20 09:34  03/26/20 09:39





  Pitocin  IM  03/26/20 09:35  10 unit





  ONETIME ONE   Administration





     





     





     





     














Departure





- Departure


Time of Disposition: 12:20


Disposition: Home, Self-Care 01


Clinical Impression: 


 Retained products of conception with hemorrhage








- Discharge Information


Instructions:  Dilation and Curettage or Vacuum Curettage, Care After, Easy-to-

Read, Postpartum Hemorrhage


Referrals: 


Charles De Dios MD [Primary Care Provider] - 


Forms:  ED Department Discharge


Additional Instructions: 


The following information is given to patients seen in the emergency department 

who are being discharged to home. This information is to outline your options 

for follow-up care. We provide all patients seen in our emergency department 

with a follow-up referral.





The need for follow-up, as well as the timing and circumstances, are variable 

depending upon the specifics of your emergency department visit.





If you don't have a primary care physician on staff, we will provide you with a 

referral. We always advise you to contact your personal physician following an 

emergency department visit to inform them of the circumstance of the visit and 

for follow-up with them and/or the need for any referrals to a consulting 

specialist.





The emergency department will also refer you to a specialist when appropriate. 

This referral assures that you have the opportunity for follow-up care with a 

specialist. All of these measure are taken in an effort to provide you with 

optimal care, which includes your follow-up.





Under all circumstances we always encourage you to contact your private 

physician who remains a resource for coordinating your care. When calling for 

follow-up care, please make the office aware that this follow-up is from your 

recent emergency room visit. If for any reason you are refused follow-up, 

please contact the [facility] Emergency 

Department at [phone] and asked to speak to the emergency department 

charge nurse.








Sepsis Event Note





- Evaluation


Sepsis Screening Result: No Definite Risk





- Focused Exam


Vital Signs: 


 Vital Signs











  Temp Pulse Resp BP Pulse Ox


 


 03/26/20 10:55   85  17  113/61  99


 


 03/26/20 10:53   64  17  56/28 L  97


 


 03/26/20 10:51   68  18  59/28 L  99


 


 03/26/20 10:50   68  16  98/54 L  99


 


 03/26/20 10:40   68  16  101/55 L  98


 


 03/26/20 10:20   68  16  104/55 L  98


 


 03/26/20 09:01  36.7 C  92  22 H  108/60  100











Date Exam was Performed: 03/26/20


Time Exam was Performed: 12:17





- My Orders


Last 24 Hours: 


My Active Orders





03/26/20 09:34


RED BLOOD CELLS LP [BBK] Stat 


TYPE AND SCREEN [BBK] Stat 





03/26/20 09:50


Transfuse Red Blood Cells [COMM] Stat 





03/26/20 09:56


Transfuse RBC [Transfuse Red Blood Cells] [COMM] Stat 





03/26/20 11:00


Norepinephrine Bit/0.9 % NaCl [Norepinephr-0.9% NaCl 4 mg/250] 4 mg in 250 ml 

IV TITRATE 














- Assessment/Plan


Last 24 Hours: 


My Active Orders





03/26/20 09:34


RED BLOOD CELLS LP [BBK] Stat 


TYPE AND SCREEN [BBK] Stat 





03/26/20 09:50


Transfuse Red Blood Cells [COMM] Stat 





03/26/20 09:56


Transfuse RBC [Transfuse Red Blood Cells] [COMM] Stat 





03/26/20 11:00


Norepinephrine Bit/0.9 % NaCl [Norepinephr-0.9% NaCl 4 mg/250] 4 mg in 250 ml 

IV TITRATE

## 2020-03-26 NOTE — CR
Chest: Portable view of the chest was obtained.

 

Comparison: No prior chest imaging.

 

Heart size and mediastinum are normal.  Lungs are clear with no acute 

parenchymal change.  Bony structures are unremarkable.

 

Impression:

1.  Nothing acute is appreciated on portable chest x-ray.

 

Diagnostic code #1

 

This report was dictated in MDT

## 2020-03-29 NOTE — CONS
DATE OF CONSULTATION:

03/26/2020

 

YOB: 1985

 

PRIMARY CARE PHYSICIAN:

Charles De Dios M.D.

 

This patient is 35.  I had seen her in the clinic couple of times.  She had

declining beta hCG level with ultrasound that shows no fetal pull and her

gestational sac was collapsing.  The patient is diagnosed with a blighted ovum,

and we elected to treat her conservatively, and I gave her Cytotec; however, the

patient presented to the emergency room with excessive vaginal bleeding.  I was

consulted.  Upon examination and upon arrival, the patient's vital signs were

stable.  Speculum examination shows copious amount of blood and blood clot in

the vagina and after removing all the blood and blood clot, the cervical os was

open and the products of conception was protruding through the cervical os.  I

used the ring forceps to remove all the products of conception.  Once that was

done, the bleeding stopped, and the uterus was contract, and the patient stopped

bleeding.  I instructed the ER personnel to observe her for a while and do

appropriate measure if it is needed and to discharge her home when she is

stable, and she is to be followed in the office sometime next week.

 

 

JOSSY / KENNETH

DD:  03/29/2020 15:06:23

DT:  03/29/2020 17:19:06

Job #:  [id number]

DEWAYNE

## 2021-03-01 NOTE — PCM.LDHP
L&D History of Present Illness





- General


Date of Service: 21


Admit Problem/Dx: 


                   Patient Status Order with Admit Dx/Problem





21 05:04


Patient Status [ADT] Routine 








                           Admission Diagnosis/Problem











Admission Diagnosis/Problem    Pregnancy














21 07:56


 presenting to L&D for IOL at 38 weeks (MARIELA: 03/15/21 by LMP and early 

ultrasound) due to AMA.  O+, Rubella immune, GBS negative.  Penicillin allergy. 

Vertex presentation by Leopold's and confirmed by handheld bedside ultrasound.  


21 08:13





Source of Information: Patient


History Limitations: Reports: No Limitations





- Related Data


Allergies/Adverse Reactions: 


                                    Allergies











Allergy/AdvReac Type Severity Reaction Status Date / Time


 


Penicillins Allergy Mild Rash Verified 21 10:59











Home Medications: 


                                    Home Meds





Pnv No.95/Ferrous Fum/Folic AC [Prenatal Caplet] 1 tab PO DAILY 21 

[History]











Past Medical History


HEENT History: Reports: Other (See Below)


Other HEENT History: has upper dental bridge


Cardiovascular History: Reports: None


Respiratory History: Reports: None


Gastrointestinal History: Reports: None


Genitourinary History: Reports: None


OB/GYN History: Reports: Pregnancy


Musculoskeletal History: Reports: None


Neurological History: Reports: None


Psychiatric History: Reports: Anxiety, Depression


Endocrine/Metabolic History: Reports: Obesity/BMI 30+


Hematologic History: Reports: None


Immunologic History: Reports: None


Oncologic (Cancer) History: Reports: None


Dermatologic History: Reports: None





- Infectious Disease History


Infectious Disease History: Reports: Chicken Pox





- Past Surgical History


Head Surgeries/Procedures: Reports: None


HEENT Surgical History: Reports: Adenoidectomy, Tonsillectomy


Cardiovascular Surgical History: Reports: None


Respiratory Surgical History: Reports: None


GI Surgical History: Reports: None


Female  Surgical History: Reports: None


Endocrine Surgical History: Reports: None


Neurological Surgical History: Reports: None


Musculoskeletal Surgical History: Reports: None


Oncologic Surgical History: Reports: None





Social & Family History





- Family History


Family Medical History: No Pertinent Family History


Respiratory: Reports: COPD, Other (See Below)


Other Respiratory Family Hisory: emphysema


: Reports: Other (See Below)


Other  Family History: kidney disease


OBGYN: Reports: Pregnancy


Endocrine/Metabolic: Reports: Diabetes, type II


Oncologic: Reports: Cervix, Lung





- Tobacco Use


Tobacco Use Status *Q: Current Every Day Tobacco User


Years of Tobacco use: 20


Packs/Tins Daily: 1


Second Hand Smoke Exposure: Yes





- Caffeine Use


Caffeine Use: Reports: Soda





- Recreational Drug Use


Recreational Drug Use: No





H&P Review of Systems





- Review of Systems:


Review Of Systems: See Below


General: Reports: No Symptoms


HEENT: Reports: No Symptoms


Pulmonary: Reports: No Symptoms


Cardiovascular: Reports: No Symptoms


Gastrointestinal: Reports: No Symptoms


Genitourinary: Reports: No Symptoms


Musculoskeletal: Reports: No Symptoms


Skin: Reports: No Symptoms


Psychiatric: Reports: No Symptoms


Neurological: Reports: No Symptoms


Hematologic/Lymphatic: Reports: No Symptoms


Immunologic: Reports: No Symptoms





L&D Exam





- Exam


Exam: See Below





- Vital Signs


Weight: 196 lb





- OB Specific


Fetal Movement: Active


Fetal Heart Tones: Present


Fetal Heart Rate (FHR) Variability: Moderate (6-25 bmp)


Birth Presentation: Vertex





- Palumbo Score


Palumbo Score Cervix Position: Midposition


Palumbo Score Consistency: Medium


Palumbo Score Effacement: 31-50%


Palumbo Score Dilation: 1-2 cm


Palumbo Score Infant's Station: -3


Palumbo Score Total: 4





- Exam


General: Alert, Oriented, Cooperative


Lungs: Normal Respiratory Effort


Cardiovascular: Regular Rate, Regular Rhythm


GI/Abdominal Exam: Soft, Non-Tender


Rectal Exam: Deferred


Genitourinary: Normal external exam


Back Exam: Normal Inspection, Full Range of Motion


Extremities: Normal Inspection, Normal Range of Motion, Non-Tender, Normal 

Capillary Refill


Skin: Warm, Dry, Intact


Neurological: Normal Speech, Normal Tone, Sensation Intact


Psychiatric: Alert, Normal Affect, Normal Mood





- Patient Data


Lab Results Last 24 hrs: 


                         Laboratory Results - last 24 hr











  21 Range/Units





  05:30 05:40 06:55 


 


WBC   12.79 H   (4.0-11.0)  K/uL


 


RBC   3.88 L   (4.30-5.90)  M/uL


 


Hgb   12.5   (12.0-16.0)  g/dL


 


Hct   37.5   (36.0-46.0)  %


 


MCV   96.6   (80.0-98.0)  fL


 


MCH   32.2 H   (27.0-32.0)  pg


 


MCHC   33.3   (31.0-37.0)  g/dL


 


RDW Std Deviation   44.6   (28.0-62.0)  fl


 


RDW Coeff of Yamile   13   (11.0-15.0)  %


 


Plt Count   248   (150-400)  K/uL


 


MPV   10.40   (7.40-12.00)  fL


 


Nucleated RBC %   0.0   /100WBC


 


Nucleated RBCs #   0   K/uL


 


SARS-CoV-2 RNA (PRATEEK)  NEGATIVE    (NEGATIVE)  


 


Blood Type    O POSITIVE  


 


Antibody Screen    NEGATIVE  











Result Diagrams: 


                                 21 05:40








- Problem List


(1) Supervision of normal IUP (intrauterine pregnancy) in multigravida


SNOMED Code(s): 934117639, 062570078, 503371150


   ICD Code: Z34.80 - ENCOUNTER FOR SUPRVSN OF NORMAL PREGNANCY, UNSP TRIMESTER 

  Status: Acute   Priority: High   Current Visit: Yes   


Qualifiers: 


   Trimester: third trimester   Qualified Code(s): Z34.83 - Encounter for 

supervision of other normal pregnancy, third trimester   





(2) AMA (advanced maternal age) multigravida 35+


SNOMED Code(s): 677689340


   ICD Code: O09.529 - SUPERVISION OF ELDERLY MULTIGRAVIDA, UNSPECIFIED 

TRIMESTER   Status: Acute   Priority: High   Current Visit: Yes   


Qualifiers: 


   Trimester: third trimester   Qualified Code(s): O09.523 - Supervision of 

elderly multigravida, third trimester   


Problem List Initiated/Reviewed/Updated: Yes


Orders Last 24hrs: 


                               Active Orders 24 hr











 Category Date Time Status


 


 Patient Status [ADT] Routine ADT  21 05:04 Active


 


 Bedrest Bathroom Privileges [RC] ASDIRECTED Care  21 05:04 Active


 


 Communication Order [RC] ASDIRECTED Care  21 05:04 Active


 


 Communication Order [RC] ASDIRECTED Care  21 05:04 Active


 


 Communication Order [RC] ASDIRECTED Care  21 05:04 Active


 


 Fetal Heart Tones [RC] CONTINUOUS Care  21 05:04 Active


 


 Fetal Non Stress Test [RC] PER UNIT ROUTINE Care  21 05:04 Active


 


 May Shower [RC] ASDIRECTED Care  21 05:04 Active


 


 Notify Provider [RC] PRN Care  21 05:04 Active


 


 Notify Provider [RC] PRN Care  21 05:04 Active


 


 Notify Provider [RC] PRN Care  21 05:04 Active


 


 Notify Provider [RC] STAT Care  21 05:04 Active


 


 Oxygen Therapy [RC] ASDIRECTED Care  21 05:04 Active


 


 Up ad Ana Cristina [RC] ASDIRECTED Care  21 05:04 Active


 


 Vaginal Exam [RC] PRN Care  21 05:04 Active


 


 Vaginal Exam [RC] PRN Care  21 05:04 Active


 


 Vital Signs [RC] PER UNIT ROUTINE Care  21 05:04 Active


 


 Vital Signs [RC] PER UNIT ROUTINE Care  21 05:04 Active


 


 RPR (SYPHILIS SERO) W/ RFLX [REF] Routine Lab  21 05:40 Received


 


 Butorphanol [Stadol] Med  21 05:04 Active





 1 mg IVPUSH Q1H PRN   


 


 Carboprost Tromethamine [Hemabate DS] Med  21 05:04 Active





 250 mcg IM ASDIRECTED PRN   


 


 Lactated Ringers [Ringers, Lactated] 1,000 ml Med  21 05:15 Active





 IV ASDIRECTED   


 


 Lidocaine 1% [Xylocaine 1%] Med  21 05:04 Active





 50 ml INJECT ONETIME PRN   


 


 Methylergonovine [Methergine] Med  21 05:04 Active





 0.2 mg IM ASDIRECTED PRN   


 


 Nalbuphine [Nubain] Med  21 05:04 Active





 10 mg IVPUSH Q1H PRN   


 


 Ondansetron [Zofran] Med  21 05:04 Active





 4 mg IVPUSH Q6H PRN   


 


 Oxytocin/0.9 % Sodium Chloride [Oxytocin 30 Unit/500 ML Med  21 05:15 

Active





 -NS]   





 30 unit in 500 ml IV TITRATE   


 


 Oxytocin/0.9 % Sodium Chloride [Oxytocin 30 Unit/500 ML Med  21 05:15 

Active





 -NS]   





 30 unit in 500 ml IV TITRATE   


 


 Sodium Chloride 0.9% [Normal Saline] Med  21 05:04 Active





 10 ml IV ASDIRECTED PRN   


 


 Sodium Chloride 0.9% [Saline Flush] Med  21 05:04 Active





 10 ml FLUSH ASDIRECTED PRN   


 


 Sodium Chloride 0.9% [Saline Flush] Med  21 05:04 Active





 2.5 ml FLUSH ASDIRECTED PRN   


 


 Terbutaline [Brethine] Med  21 05:04 Active





 0.25 mg SUBCUT ASDIRECTED PRN   


 


 Tranexamic Acid [Cyklokapron] 1,000 mg Med  21 05:04 Active





 Sodium Chloride 0.9% [Normal Saline] 100 ml   





 IV ONETIME   


 


 Water For Irrigation,Sterile [Sterile Water for Med  21 05:04 Active





 Irrigation]   





 1,000 ml IRR ASDIRECTED PRN   


 


 miSOPROStoL [Cytotec] Med  21 05:04 Active





 200 mcg PO ONETIME PRN   


 


 miSOPROStoL [Cytotec] Med  21 05:04 Active





 25 mcg VAG ONETIME PRN   


 


 miSOPROStoL [Cytotec] Med  21 05:04 Active





 25 mcg VAG Q4H PRN   


 


 Fetal Scalp Electrode [WOMSER] Per Unit Routine Oth  21 05:04 Ordered


 


 Medication Administration Instruction [OM.PC] Q3H Oth  21 05:15 Ordered


 


 Peripheral IV Insertion Adult [OM.PC] Routine Oth  21 05:04 Ordered


 


 Resuscitation Status Routine Resus Stat  21 05:04 Ordered








                                Medication Orders





Butorphanol Tartrate (Stadol)  1 mg IVPUSH Q1H PRN


   PRN Reason: Pain


Carboprost Tromethamine (Hemabate Ds)  250 mcg IM ASDIRECTED PRN


   PRN Reason: Post Partum Hemorrhage


Oxytocin/Sodium Chloride (Oxytocin 30 Unit/500 Ml-Ns)  30 unit in 500 mls @ 999 

mls/hr IV TITRATE PRATEEK


Tranexamic Acid 1,000 mg/ (Sodium Chloride)  110 mls @ 660 mls/hr IV ONETIME PRN


   PRN Reason: Bleeding


Oxytocin/Sodium Chloride (Oxytocin 30 Unit/500 Ml-Ns)  30 unit in 500 mls @ 2 

mls/hr IV TITRATE [facility]; Protocol


Lactated Ringer's (Ringers, Lactated)  1,000 mls @ 150 mls/hr IV ASDIRECTED PRATEEK


   Last Admin: 21 06:30  Dose: 150 mls/hr


   Documented by: ARABELLA


Lidocaine HCl (Xylocaine 1%)  50 ml INJECT ONETIME PRN


   PRN Reason: Laceration repair


Methylergonovine Maleate (Methergine)  0.2 mg IM ASDIRECTED PRN


   PRN Reason: Post Partum Hemorrhage


Misoprostol (Cytotec)  200 mcg PO ONETIME PRN


   PRN Reason: Post Partum Hemorrhage


Misoprostol (Cytotec)  25 mcg VAG ONETIME PRN


   PRN Reason: Cervical Ripening


Misoprostol (Cytotec)  25 mcg VAG Q4H PRN


   PRN Reason: Cervical Ripening


Nalbuphine HCl (Nubain)  10 mg IVPUSH Q1H PRN


   PRN Reason: Pain (severe 7-10)


Ondansetron HCl (Zofran)  4 mg IVPUSH Q6H PRN


   PRN Reason: Nausea/Vomiting


Sodium Chloride (Saline Flush)  10 ml FLUSH ASDIRECTED PRN


   PRN Reason: Keep Vein Open


Sodium Chloride (Saline Flush)  2.5 ml FLUSH ASDIRECTED PRN


   PRN Reason: Keep Vein Open


Sodium Chloride (Normal Saline)  10 ml IV ASDIRECTED PRN


   PRN Reason: IV Use


Sterile Water (Sterile Water For Irrigation)  1,000 ml IRR ASDIRECTED PRN


   PRN Reason: delivery


Terbutaline Sulfate (Brethine)  0.25 mg SUBCUT ASDIRECTED PRN


   PRN Reason: Tacysystole








Assessment/Plan Comment:: 





Admit


A:  presenting to L&D for IOL at 38 weeks (MARIELA: 03/15/21 by LMP and early

 ultrasound) due to AMA.  O+, Rubella immune, GBS negative.  Penicillin allergy.

 Vertex presentation by Leopold's and confirmed by handheld bedside ultrasound.


P: Admit for induction of labor; cytotec to pitocin PRN; epidural PRN; 

anticipate ; Dr. Britt updated.

## 2021-03-01 NOTE — PCM.DEL
L & D Note





- General Info


Date of Service: 21


Mother's Due Date: 03/15/21





- Delivery Note


Labor: Spontaneous


Cervical Ripening Method: Misoprostil


Delivery Outcome: Livebirth


Infant Delivery Method: Spontaneous Vaginal Delivery-Single


Birth Presentation: Vertex


Nuchal Cord: None


Anesthesia Type: None


Episiotomy Type: None


Laceration: None


Placenta: Intact, Spontaneous


Cord: 3 Vessels


Estimated Blood Loss: 300


Resuscitation Needed: No


APGAR Score 1 min: 9


APGAR Score 5 min: 9


Second Stage Interventions: Reports: Second Nurse Assessed Progress of Descent, 

Second Nurse Reviewed Contraction Pattern, Second Nurse Reviewed Fetal Heart 

Tones, Encouragement Given, Pushing, Feet in Foot Rests


Delivery Comments (Free Text/Narrative):: 





 viable male; head delivered with good pushing, shoulders and body followed 

easily after; baby to mom's abdomen skin-to-skin for assessment; APGARs 9/9, 

weight pending; cord doubly clamped, cut by FOB after cessation of pulsing; 

placenta delivered grossly intact; 3VC;  mL; perineum intact; pitocin to 

IVF; mom and baby left in stable condition with nurse at bedside for assessment





- General Info


Date of Service: 21


Admission Dx/Problem (Free Text): 


                   Patient Status Order with Admit Dx/Problem





21 05:04


Patient Status [ADT] Routine 








                           Admission Diagnosis/Problem











Admission Diagnosis/Problem    Pregnancy














21 07:56


 presenting to L&D for IOL at 38 weeks (MARIELA: 03/15/21 by LMP and early 

ultrasound) due to AMA.  O+, Rubella immune, GBS negative.  Penicillin allergy. 

Vertex presentation by Leopold's and confirmed by handheld bedside ultrasound.  


21 08:13





Functional Status: Reports: Pain Controlled





- Review of Systems


General: Reports: No Symptoms


HEENT: Reports: No Symptoms


Pulmonary: Reports: No Symptoms


Cardiovascular: Reports: No Symptoms


Gastrointestinal: Reports: No Symptoms


Genitourinary: Reports: No Symptoms


Musculoskeletal: Reports: No Symptoms


Skin: Reports: No Symptoms


Neurological: Reports: No Symptoms


Psychiatric: Reports: No Symptoms





- Patient Data


Weight - Most Recent: 196 lb


I&O - Last 24 Hours: 


                                 Intake & Output











 21





 06:59 14:59 22:59


 


Intake Total  1000 950


 


Balance  1000 950











Lab Results Last 24 Hours: 


                         Laboratory Results - last 24 hr











  21 Range/Units





  05:30 05:40 06:55 


 


WBC   12.79 H   (4.0-11.0)  K/uL


 


RBC   3.88 L   (4.30-5.90)  M/uL


 


Hgb   12.5   (12.0-16.0)  g/dL


 


Hct   37.5   (36.0-46.0)  %


 


MCV   96.6   (80.0-98.0)  fL


 


MCH   32.2 H   (27.0-32.0)  pg


 


MCHC   33.3   (31.0-37.0)  g/dL


 


RDW Std Deviation   44.6   (28.0-62.0)  fl


 


RDW Coeff of Yamile   13   (11.0-15.0)  %


 


Plt Count   248   (150-400)  K/uL


 


MPV   10.40   (7.40-12.00)  fL


 


Nucleated RBC %   0.0   /100WBC


 


Nucleated RBCs #   0   K/uL


 


SARS-CoV-2 RNA (PRTAEEK)  NEGATIVE    (NEGATIVE)  


 


Blood Type    O POSITIVE  


 


Antibody Screen    NEGATIVE  











Med Orders - Current: 


                               Current Medications








Discontinued Medications





Butorphanol Tartrate (Stadol)  1 mg IVPUSH Q1H PRN


   PRN Reason: Pain


Carboprost Tromethamine (Hemabate Ds)  250 mcg IM ASDIRECTED PRN


   PRN Reason: Post Partum Hemorrhage


Oxytocin/Sodium Chloride (Oxytocin 30 Unit/500 Ml-Ns)  30 unit in 500 mls @ 999 

mls/hr IV TITRATE PRATEEK


   Last Admin: 21 17:36 Dose:  500 mls/hr


   Documented by: 


Tranexamic Acid 1,000 mg/ (Sodium Chloride)  110 mls @ 660 mls/hr IV ONETIME PRN


   PRN Reason: Bleeding


Oxytocin/Sodium Chloride (Oxytocin 30 Unit/500 Ml-Ns)  30 unit in 500 mls @ 2 

mls/hr IV TITRATE PRATEEK; Protocol


Lactated Ringer's (Ringers, Lactated)  1,000 mls @ 150 mls/hr IV ASDIRECTED PRATEEK


   Last Admin: 21 11:35 Dose:  150 mls/hr


   Documented by: 


Lidocaine HCl (Xylocaine 1%)  50 ml INJECT ONETIME PRN


   PRN Reason: Laceration repair


Methylergonovine Maleate (Methergine)  0.2 mg IM ASDIRECTED PRN


   PRN Reason: Post Partum Hemorrhage


Misoprostol (Cytotec)  200 mcg PO ONETIME PRN


   PRN Reason: Post Partum Hemorrhage


Misoprostol (Cytotec)  25 mcg VAG ONETIME PRN


   PRN Reason: Cervical Ripening


   Last Admin: 21 12:21 Dose:  25 mcg


   Documented by: 


Misoprostol (Cytotec)  25 mcg VAG Q4H PRN


   PRN Reason: Cervical Ripening


   Last Admin: 21 08:18 Dose:  25 mcg


   Documented by: 


Misoprostol (Cytotec)  25 mcg PO ONETIME ONE


   Stop: 21 05:24


   Last Admin: 21 08:11 Dose:  25 mcg


   Documented by: 


Misoprostol (Cytotec)  25 mcg PO Q4H PRATEEK


   Last Admin: 21 12:19 Dose:  25 mcg


   Documented by: 


Nalbuphine HCl (Nubain)  10 mg IVPUSH Q1H PRN


   PRN Reason: Pain (severe 7-10)


Ondansetron HCl (Zofran)  4 mg IVPUSH Q6H PRN


   PRN Reason: Nausea/Vomiting


Sodium Chloride (Saline Flush)  10 ml FLUSH ASDIRECTED PRN


   PRN Reason: Keep Vein Open


Sodium Chloride (Saline Flush)  2.5 ml FLUSH ASDIRECTED PRN


   PRN Reason: Keep Vein Open


Sodium Chloride (Normal Saline)  10 ml IV ASDIRECTED PRN


   PRN Reason: IV Use


Sterile Water (Sterile Water For Irrigation)  1,000 ml IRR ASDIRECTED PRN


   PRN Reason: delivery


Terbutaline Sulfate (Brethine)  0.25 mg SUBCUT ASDIRECTED PRN


   PRN Reason: Tacysystole











- Exam


General: Alert, Oriented, Cooperative, No Acute Distress


Lungs: Normal Respiratory Effort


Cardiovascular: Regular Rate, Regular Rhythm


GI/Abdominal Exam: Soft, Non-Tender


 (Female) Exam: Normal External Exam


Back Exam: Normal Inspection, Full Range of Motion


Extremities: Normal Inspection, Normal Range of Motion, Non-Tender, Normal 

Capillary Refill


Skin: Warm, Dry, Intact


Neurological: No New Focal Deficit, Normal Speech, Normal Tone, Strength Equal 

Bilateral, Sensation Intact


Psy/Mental Status: Alert, Normal Affect, Normal Mood





- Problem List & Annotations


(1) Supervision of normal IUP (intrauterine pregnancy) in multigravida


SNOMED Code(s): 583861543, 530521666, 775558717


   Code(s): Z34.80 - ENCOUNTER FOR SUPRVSN OF NORMAL PREGNANCY, UNSP TRIMESTER  

 Status: Acute   Priority: High   Current Visit: Yes   


Qualifiers: 


   Trimester: third trimester   Qualified Code(s): Z34.83 - Encounter for 

supervision of other normal pregnancy, third trimester   





(2) AMA (advanced maternal age) multigravida 35+


SNOMED Code(s): 448956963


   Code(s): O09.529 - SUPERVISION OF ELDERLY MULTIGRAVIDA, UNSPECIFIED TRIMESTER

   Status: Acute   Priority: High   Current Visit: Yes   


Qualifiers: 


   Trimester: third trimester   Qualified Code(s): O09.523 - Supervision of 

elderly multigravida, third trimester   





(3)  (spontaneous vaginal delivery)


SNOMED Code(s): 068599191


   Code(s): O80 - ENCOUNTER FOR FULL-TERM UNCOMPLICATED DELIVERY   Status: Acute

   Priority: High   Current Visit: Yes   





- Problem List Review


Problem List Initiated/Reviewed/Updated: Yes





- My Orders


Last 24 Hours: 


My Active Orders





21 Dinner


Regular Diet [DIET] 





21 17:48


Patient Status [ADT] Routine 


May Shower [RC] ASDIRECTED 


Up ad Ana Cristina [RC] ASDIRECTED 


Vital Signs [RC] PER UNIT ROUTINE 


Acetaminophen [Tylenol Extra Strength]   1,000 mg PO Q4H PRN 


Acetaminophen [Tylenol Extra Strength]   500 mg PO Q4H PRN 


Benzocaine/Menthol [Dermoplast Pain Relief 20%-0.5% Spray]   78 gm TOP 

ASDIRECTED PRN 


Docusate Sodium [Colace]   100 mg PO BID PRN 


Ibuprofen [Motrin]   400 mg PO Q4H PRN 


Ibuprofen [Motrin]   800 mg PO Q6H PRN 


Lanolin [Lansinoh HPA]   See Dose Instructions  TOP ASDIRECTED PRN 


bisacodyL [Dulcolax]   10 mg RECTAL ONETIME PRN 


oxyCODONE   5 mg PO Q2H PRN 


witch hazeL [Tucks]   1 pad TOP ASDIRECTED PRN 


Assess Lochia [WOMSER] Per Unit Routine 


Assess Uterine Involution [WOMSER] Per Unit Routine 


Peripheral IV Discontinue [OM.PC] Routine 


Resuscitation Status Routine 





21 05:11


HEMOGLOBIN/HEMATOCRIT,HH [HEME] Timed 














- Plan


Plan:: 





Admit


A:  presenting to L&D for IOL at 38 weeks (MARIELA: 03/15/21 by LMP and early

 ultrasound) due to AMA.  O+, Rubella immune, GBS negative.  Penicillin allergy.

 Vertex presentation by Leopold's and confirmed by handheld bedside ultrasound.


P: Admit for induction of labor; cytotec to pitocin PRN; epidural PRN; 

anticipate ; Dr. Britt updated. 





Delivery


A:  viable male; APGARs 9/9, weight pending; cord doubly clamped, cut by FOB 

after cessation of pulsing; placenta delivered grossly intact; 3VC;  mL; 

perineum intact; pitocin to IVF; mom and baby left in stable condition with 

nurse at bedside for assessment


P: Routine postpartum plan of care; Dr. Britt updated.

## 2021-03-02 NOTE — [id number]
**Discharge Summary





- Hospital Course


Free Text/Narrative:: 





Discharge home with baby.  Follow up in the clinic in 6 weeks for routine 

postpartum visit; sooner, if needed. 


Diagnosis: Stroke: No


Modified Dennys Scale: No Symptoms at All


Modified Trego Scale Score: 0





- Discharge Data


Discharge Date: 21


Discharge Disposition: Home, Self-Care 01


Condition: Good





- Referral to Home Health


Primary Care Physician: 


PCP None








- Discharge Diagnosis/Problem(s)


(1) Supervision of normal IUP (intrauterine pregnancy) in multigravida


SNOMED Code(s): 670300716, 051142615, 603073626


   ICD Code: Z34.80 - ENCOUNTER FOR SUPRVSN OF NORMAL PREGNANCY, UNSP TRIMESTER 

 Status: Acute   Priority: High   Current Visit: Yes   


Qualifiers: 


   Trimester: third trimester   Qualified Code(s): Z34.83 - Encounter for 

supervision of other normal pregnancy, third trimester   





(2) AMA (advanced maternal age) multigravida 35+


SNOMED Code(s): 466769949


   ICD Code: O09.529 - SUPERVISION OF ELDERLY MULTIGRAVIDA, UNSPECIFIED 

TRIMESTER   Status: Acute   Priority: High   Current Visit: Yes   


Qualifiers: 


   Trimester: third trimester   Qualified Code(s): O09.523 - Supervision of 

elderly multigravida, third trimester   





(3)  (spontaneous vaginal delivery)


SNOMED Code(s): 969703404


   ICD Code: O80 - ENCOUNTER FOR FULL-TERM UNCOMPLICATED DELIVERY   Status: 

Acute   Priority: High   Current Visit: Yes   





- Patient Instructions


Diet: Regular Diet as Tolerated, Drink 8-10+ Glasses/Day


Activity: As Tolerated, No Strenuous Activities, Rest and Relax Today


Driving: May Drive Today


Showering/Bathing: May Shower


Notify Provider of: Fever, Increased Pain, Swelling and Redness, Drainage, 

Nausea and/or Vomiting





- Discharge Plan


*PRESCRIPTION DRUG MONITORING PROGRAM REVIEWED*: Not Applicable


*COPY OF PRESCRIPTION DRUG MONITORING REPORT IN PATIENT KEITH: Not Applicable


Prescriptions/Med Rec: 


Ibuprofen [Motrin] 800 mg PO Q6H PRN #90 tablet


 PRN Reason: Pain


Home Medications: 


                                    Home Meds





Pnv No.95/Ferrous Fum/Folic AC [Prenatal Caplet] 1 tab PO DAILY 21 

[History]


Ibuprofen [Motrin] 800 mg PO Q6H PRN #90 tablet 21 [Rx]








Patient Handouts:  Postpartum Baby Blues, Postpartum Care After Vaginal Delivery





- Discharge Summary/Plan Comment


DC Time >30 min.: Yes





- General Info


Date of Service: 21


Admission Dx/Problem (Free Text: 


                   Patient Status Order with Admit Dx/Problem





21 05:04


Patient Status [ADT] Routine 








                           Admission Diagnosis/Problem











Admission Diagnosis/Problem    Pregnancy














21 07:56


 presenting to L&D for IOL at 38 weeks (MARIELA: 03/15/21 by LMP and early 

ultrasound) due to AMA.  O+, Rubella immune, GBS negative.  Penicillin allergy. 

Vertex presentation by Leopold's and confirmed by handheld bedside ultrasound.  


21 08:13





Functional Status: Reports: Pain Controlled, Tolerating Diet, Ambulating, 

Urinating





- Review of Systems


General: Reports: No Symptoms


HEENT: Reports: No Symptoms


Pulmonary: Reports: No Symptoms


Cardiovascular: Reports: No Symptoms


Gastrointestinal: Reports: No Symptoms


Genitourinary: Reports: No Symptoms


Musculoskeletal: Reports: No Symptoms


Skin: Reports: No Symptoms


Neurological: Reports: No Symptoms


Psychiatric: Reports: No Symptoms





- Patient Data


Vitals - Most Recent: 


                                Last Vital Signs











Temp  97.7 F   21 19:30


 


Pulse  78   21 05:05


 


Resp  16   21 05:05


 


BP  117/70   21 05:05


 


Pulse Ox  97   21 05:05











Weight - Most Recent: 196 lb


I&O - Last 24 hours: 


                                 Intake & Output











 21





 22:59 06:59 14:59


 


Intake Total 950  


 


Balance 950  











Lab Results - Last 24 hrs: 


                         Laboratory Results - last 24 hr











  21 Range/Units





  06:55 05:18 


 


Hgb   11.8 L  (12.0-16.0)  g/dL


 


Hct   35.7 L  (36.0-46.0)  %


 


Blood Type  O POSITIVE   


 


Antibody Screen  NEGATIVE   











Med Orders - Current: 


                               Current Medications





Acetaminophen (Tylenol Extra Strength)  500 mg PO Q4H PRN


   PRN Reason: Pain


Acetaminophen (Tylenol Extra Strength)  1,000 mg PO Q4H PRN


   PRN Reason: Pain


Benzocaine/Menthol (Dermoplast Pain Relief 20%-0.5% Spray)  78 gm TOP ASDIRECTED

 PRN


   PRN Reason: Perineal Comfort Measure


Bisacodyl (Dulcolax)  10 mg RECTAL ONETIME PRN


   PRN Reason: Constipation


Docusate Sodium (Colace)  100 mg PO BID PRN


   PRN Reason: Constipation


Emollient Ointment (Lansinoh Hpa)  0 gm TOP ASDIRECTED PRN


   PRN Reason: Sore Nipples


Ibuprofen (Motrin)  400 mg PO Q4H PRN


   PRN Reason: Pain


Ibuprofen (Motrin)  800 mg PO Q6H PRN


   PRN Reason: Pain


   Last Admin: 21 06:02 Dose:  800 mg


   Documented by: 


Oxycodone HCl (Oxycodone)  5 mg PO Q2H PRN


   PRN Reason: Pain


Witch Hazel (Tucks)  1 pad TOP ASDIRECTED PRN


   PRN Reason: comfort care





Discontinued Medications





Butorphanol Tartrate (Stadol)  1 mg IVPUSH Q1H PRN


   PRN Reason: Pain


Carboprost Tromethamine (Hemabate Ds)  250 mcg IM ASDIRECTED PRN


   PRN Reason: Post Partum Hemorrhage


Oxytocin/Sodium Chloride (Oxytocin 30 Unit/500 Ml-Ns)  30 unit in 500 mls @ 999 

mls/hr IV TITRATE [facility]


   Last Admin: 21 17:36 Dose:  500 mls/hr


   Documented by: 


Tranexamic Acid 1,000 mg/ (Sodium Chloride)  110 mls @ 660 mls/hr IV ONETIME PRN


   PRN Reason: Bleeding


Oxytocin/Sodium Chloride (Oxytocin 30 Unit/500 Ml-Ns)  30 unit in 500 mls @ 2 

mls/hr IV TITRATE [facility]; Protocol


Lactated Ringer's (Ringers, Lactated)  1,000 mls @ 150 mls/hr IV ASDIRECTED PRATEEK


   Last Admin: 21 11:35 Dose:  150 mls/hr


   Documented by: 


Lidocaine HCl (Xylocaine 1%)  50 ml INJECT ONETIME PRN


   PRN Reason: Laceration repair


Methylergonovine Maleate (Methergine)  0.2 mg IM ASDIRECTED PRN


   PRN Reason: Post Partum Hemorrhage


Misoprostol (Cytotec)  200 mcg PO ONETIME PRN


   PRN Reason: Post Partum Hemorrhage


Misoprostol (Cytotec)  25 mcg VAG ONETIME PRN


   PRN Reason: Cervical Ripening


   Last Admin: 21 12:21 Dose:  25 mcg


   Documented by: 


Misoprostol (Cytotec)  25 mcg VAG Q4H PRN


   PRN Reason: Cervical Ripening


   Last Admin: 21 08:18 Dose:  25 mcg


   Documented by: 


Misoprostol (Cytotec)  25 mcg PO ONETIME ONE


   Stop: 21 05:24


   Last Admin: 21 08:11 Dose:  25 mcg


   Documented by: 


Misoprostol (Cytotec)  25 mcg PO Q4H PRATEEK


   Last Admin: 21 12:19 Dose:  25 mcg


   Documented by: 


Nalbuphine HCl (Nubain)  10 mg IVPUSH Q1H PRN


   PRN Reason: Pain (severe 7-10)


Ondansetron HCl (Zofran)  4 mg IVPUSH Q6H PRN


   PRN Reason: Nausea/Vomiting


Sodium Chloride (Saline Flush)  10 ml FLUSH ASDIRECTED PRN


   PRN Reason: Keep Vein Open


Sodium Chloride (Saline Flush)  2.5 ml FLUSH ASDIRECTED PRN


   PRN Reason: Keep Vein Open


Sodium Chloride (Normal Saline)  10 ml IV ASDIRECTED PRN


   PRN Reason: IV Use


Sterile Water (Sterile Water For Irrigation)  1,000 ml IRR ASDIRECTED PRN


   PRN Reason: delivery


Terbutaline Sulfate (Brethine)  0.25 mg SUBCUT ASDIRECTED PRN


   PRN Reason: Tacysystole











- Exam


General: Reports: Alert, Oriented, Cooperative, No Acute Distress


Lungs: Reports: Normal Respiratory Effort


Cardiovascular: Reports: Regular Rate, Regular Rhythm


GI/Abdominal Exam: Soft, Non-Tender


 (Female) Exam: Deferred


Rectal (Female) Exam: Deferred


Back Exam: Reports: Normal Inspection, Full Range of Motion


Extremities: Normal Inspection, Normal Range of Motion, Non-Tender, Normal 

Capillary Refill


Skin: Reports: Warm, Dry, Intact


Neurological: Reports: No New Focal Deficit, Normal Gait, Normal Speech, Normal 

Tone, Sensation Intact


Psy/Mental Status: Reports: Alert, Normal Affect, Normal Mood